# Patient Record
Sex: FEMALE | Race: OTHER | NOT HISPANIC OR LATINO
[De-identification: names, ages, dates, MRNs, and addresses within clinical notes are randomized per-mention and may not be internally consistent; named-entity substitution may affect disease eponyms.]

---

## 2017-03-10 ENCOUNTER — APPOINTMENT (OUTPATIENT)
Dept: UROLOGY | Facility: CLINIC | Age: 62
End: 2017-03-10

## 2017-03-10 DIAGNOSIS — N13.30 UNSPECIFIED HYDRONEPHROSIS: ICD-10-CM

## 2017-03-13 LAB
APPEARANCE: CLEAR
BACTERIA UR CULT: ABNORMAL
BACTERIA: ABNORMAL
BILIRUBIN URINE: NEGATIVE
BLOOD URINE: ABNORMAL
COLOR: YELLOW
CORE LAB FLUID CYTOLOGY: NORMAL
GLUCOSE QUALITATIVE U: NORMAL MG/DL
HYALINE CASTS: 0 /LPF
KETONES URINE: NEGATIVE
LEUKOCYTE ESTERASE URINE: ABNORMAL
MICROSCOPIC-UA: NORMAL
NITRITE URINE: POSITIVE
PH URINE: 6
PROTEIN URINE: NEGATIVE MG/DL
RED BLOOD CELLS URINE: 2 /HPF
SPECIFIC GRAVITY URINE: 1.02
SQUAMOUS EPITHELIAL CELLS: 4 /HPF
UROBILINOGEN URINE: NORMAL MG/DL
WHITE BLOOD CELLS URINE: 36 /HPF

## 2017-03-23 ENCOUNTER — FORM ENCOUNTER (OUTPATIENT)
Age: 62
End: 2017-03-23

## 2017-03-24 ENCOUNTER — APPOINTMENT (OUTPATIENT)
Dept: UROLOGY | Facility: CLINIC | Age: 62
End: 2017-03-24

## 2017-03-24 ENCOUNTER — OUTPATIENT (OUTPATIENT)
Dept: OUTPATIENT SERVICES | Facility: HOSPITAL | Age: 62
LOS: 1 days | End: 2017-03-24
Payer: COMMERCIAL

## 2017-03-24 ENCOUNTER — APPOINTMENT (OUTPATIENT)
Dept: CT IMAGING | Facility: IMAGING CENTER | Age: 62
End: 2017-03-24

## 2017-03-24 VITALS — SYSTOLIC BLOOD PRESSURE: 117 MMHG | DIASTOLIC BLOOD PRESSURE: 75 MMHG | RESPIRATION RATE: 16 BRPM | HEART RATE: 56 BPM

## 2017-03-24 DIAGNOSIS — R31.0 GROSS HEMATURIA: ICD-10-CM

## 2017-03-24 DIAGNOSIS — R35.0 FREQUENCY OF MICTURITION: ICD-10-CM

## 2017-03-24 PROCEDURE — 52000 CYSTOURETHROSCOPY: CPT

## 2017-03-24 PROCEDURE — 74178 CT ABD&PLV WO CNTR FLWD CNTR: CPT

## 2017-03-24 PROCEDURE — 82565 ASSAY OF CREATININE: CPT

## 2017-03-27 LAB
APPEARANCE: CLEAR
BACTERIA UR CULT: NORMAL
BACTERIA: NEGATIVE
BILIRUBIN URINE: NEGATIVE
BLOOD URINE: ABNORMAL
COLOR: YELLOW
GLUCOSE QUALITATIVE U: NORMAL MG/DL
HYALINE CASTS: 0 /LPF
KETONES URINE: NEGATIVE
LEUKOCYTE ESTERASE URINE: ABNORMAL
MICROSCOPIC-UA: NORMAL
NITRITE URINE: NEGATIVE
PH URINE: 6
PROTEIN URINE: NEGATIVE MG/DL
RED BLOOD CELLS URINE: 2 /HPF
SPECIFIC GRAVITY URINE: 1.04
SQUAMOUS EPITHELIAL CELLS: 4 /HPF
UROBILINOGEN URINE: NORMAL MG/DL
WHITE BLOOD CELLS URINE: 4 /HPF

## 2017-03-28 ENCOUNTER — MESSAGE (OUTPATIENT)
Age: 62
End: 2017-03-28

## 2017-03-28 PROBLEM — R31.0 GROSS HEMATURIA: Status: ACTIVE | Noted: 2017-03-10

## 2017-03-31 DIAGNOSIS — R31.0 GROSS HEMATURIA: ICD-10-CM

## 2018-11-20 ENCOUNTER — OUTPATIENT (OUTPATIENT)
Dept: OUTPATIENT SERVICES | Facility: HOSPITAL | Age: 63
LOS: 1 days | Discharge: HOME | End: 2018-11-20
Payer: COMMERCIAL

## 2018-11-20 DIAGNOSIS — Z01.818 ENCOUNTER FOR OTHER PREPROCEDURAL EXAMINATION: ICD-10-CM

## 2018-11-20 PROCEDURE — 71046 X-RAY EXAM CHEST 2 VIEWS: CPT | Mod: 26

## 2018-12-10 ENCOUNTER — APPOINTMENT (OUTPATIENT)
Dept: CV DIAGNOSTICS | Facility: HOSPITAL | Age: 63
End: 2018-12-10

## 2018-12-11 ENCOUNTER — APPOINTMENT (OUTPATIENT)
Dept: CV DIAGNOSTICS | Facility: HOSPITAL | Age: 63
End: 2018-12-11

## 2018-12-11 ENCOUNTER — OUTPATIENT (OUTPATIENT)
Dept: OUTPATIENT SERVICES | Facility: HOSPITAL | Age: 63
LOS: 1 days | End: 2018-12-11
Payer: COMMERCIAL

## 2018-12-11 DIAGNOSIS — I25.10 ATHEROSCLEROTIC HEART DISEASE OF NATIVE CORONARY ARTERY WITHOUT ANGINA PECTORIS: ICD-10-CM

## 2018-12-11 PROCEDURE — 93017 CV STRESS TEST TRACING ONLY: CPT

## 2018-12-11 PROCEDURE — A9500: CPT

## 2018-12-11 PROCEDURE — 93018 CV STRESS TEST I&R ONLY: CPT

## 2018-12-11 PROCEDURE — 78452 HT MUSCLE IMAGE SPECT MULT: CPT | Mod: 26

## 2018-12-11 PROCEDURE — 93016 CV STRESS TEST SUPVJ ONLY: CPT

## 2018-12-11 PROCEDURE — 78452 HT MUSCLE IMAGE SPECT MULT: CPT

## 2020-06-29 ENCOUNTER — APPOINTMENT (OUTPATIENT)
Dept: UROLOGY | Facility: CLINIC | Age: 65
End: 2020-06-29
Payer: COMMERCIAL

## 2020-06-29 VITALS
SYSTOLIC BLOOD PRESSURE: 115 MMHG | DIASTOLIC BLOOD PRESSURE: 73 MMHG | HEIGHT: 62 IN | WEIGHT: 135 LBS | TEMPERATURE: 98 F | BODY MASS INDEX: 24.84 KG/M2 | HEART RATE: 76 BPM | RESPIRATION RATE: 16 BRPM

## 2020-06-29 VITALS — TEMPERATURE: 98.2 F

## 2020-06-29 DIAGNOSIS — R31.29 OTHER MICROSCOPIC HEMATURIA: ICD-10-CM

## 2020-06-29 PROCEDURE — 99203 OFFICE O/P NEW LOW 30 MIN: CPT

## 2020-06-29 RX ORDER — SULFAMETHOXAZOLE AND TRIMETHOPRIM 800; 160 MG/1; MG/1
800-160 TABLET ORAL
Qty: 14 | Refills: 0 | Status: DISCONTINUED | COMMUNITY
Start: 2017-03-13 | End: 2020-06-29

## 2020-06-29 RX ORDER — ANASTROZOLE TABLETS 1 MG/1
TABLET ORAL
Refills: 0 | Status: ACTIVE | COMMUNITY

## 2020-06-29 RX ORDER — ATORVASTATIN CALCIUM 80 MG/1
TABLET, FILM COATED ORAL
Refills: 0 | Status: ACTIVE | COMMUNITY

## 2020-06-29 RX ORDER — METFORMIN HYDROCHLORIDE 625 MG/1
TABLET ORAL
Refills: 0 | Status: ACTIVE | COMMUNITY

## 2020-06-29 NOTE — HISTORY OF PRESENT ILLNESS
[FreeTextEntry1] : 66yo female with cc of microscopic hematuria. Pt was seen in 2013 for microscopic blood and then in 2017 for gross blood. No tobacco hx,  smoked 2ppd though for 30+ years (outside the house). No exposures. No prior hx of stones. Brother with hx of stones (and stone surgery). She had CT that showed L side stone and was scheduled for ESWL that was cancelled twice and came here for a second opinion. US showed mild R hydro, no stones. On the left, there is a 4+mm lower pole stone and no hydro. She underwent renal scan that showed no obstruction. in 2017 she had repeat. CT showed stone on LLP. She was noted incidentally to have thickened endometrium. She had negative eval and is seeing them again soon. \par \par Since last visit, had L TKR and discovered R breast ca s/p mastectomy. No chemo or RT. \par \par No gross blood. SHe was recently seen by onc and had labs that showed microscopic blood. At baseline, has some frequency but is not bothered by this. She is trying to drink water better. No urinary incontinence.

## 2020-06-29 NOTE — REVIEW OF SYSTEMS
[Told you have blood in urine on a urine test] : told blood was present in a urine test [History of kidney stones] : history of kidney stones [Joint Pain] : joint pain [Limb Weakness] : limb weakness [Negative] : Heme/Lymph

## 2020-06-29 NOTE — ASSESSMENT
[FreeTextEntry1] : Microscopic hemturia with negative eval in 2017. Known stone as likely source. Will plan on renal US for eval. \par --Renal US. will call pt with results

## 2020-07-15 ENCOUNTER — APPOINTMENT (OUTPATIENT)
Dept: ULTRASOUND IMAGING | Facility: IMAGING CENTER | Age: 65
End: 2020-07-15
Payer: COMMERCIAL

## 2020-07-15 ENCOUNTER — OUTPATIENT (OUTPATIENT)
Dept: OUTPATIENT SERVICES | Facility: HOSPITAL | Age: 65
LOS: 1 days | End: 2020-07-15
Payer: COMMERCIAL

## 2020-07-15 DIAGNOSIS — N20.0 CALCULUS OF KIDNEY: ICD-10-CM

## 2020-07-15 DIAGNOSIS — R31.29 OTHER MICROSCOPIC HEMATURIA: ICD-10-CM

## 2020-07-15 PROCEDURE — 76770 US EXAM ABDO BACK WALL COMP: CPT | Mod: 26

## 2020-07-15 PROCEDURE — 76770 US EXAM ABDO BACK WALL COMP: CPT

## 2021-01-15 ENCOUNTER — APPOINTMENT (OUTPATIENT)
Dept: UROLOGY | Facility: CLINIC | Age: 66
End: 2021-01-15

## 2023-06-23 ENCOUNTER — APPOINTMENT (OUTPATIENT)
Dept: UROLOGY | Facility: CLINIC | Age: 68
End: 2023-06-23
Payer: MEDICARE

## 2023-06-23 VITALS
SYSTOLIC BLOOD PRESSURE: 116 MMHG | DIASTOLIC BLOOD PRESSURE: 73 MMHG | HEIGHT: 62 IN | BODY MASS INDEX: 24.84 KG/M2 | OXYGEN SATURATION: 99 % | HEART RATE: 70 BPM | WEIGHT: 135 LBS

## 2023-06-23 DIAGNOSIS — N20.0 CALCULUS OF KIDNEY: ICD-10-CM

## 2023-06-23 PROCEDURE — 99213 OFFICE O/P EST LOW 20 MIN: CPT

## 2023-06-23 RX ORDER — LOSARTAN POTASSIUM 100 MG/1
TABLET, FILM COATED ORAL
Refills: 0 | Status: ACTIVE | COMMUNITY

## 2023-06-23 RX ORDER — LISINOPRIL 30 MG/1
TABLET ORAL
Refills: 0 | Status: DISCONTINUED | COMMUNITY
End: 2023-06-23

## 2023-06-23 NOTE — ASSESSMENT
[FreeTextEntry1] : Known hx of stones. Has had R flank pain. Last imaging was 6/2020 and no stones on R. Is planning on having CT next week in order from GI. \par --CT next week. Asked pt to have results sent to me and obtain disc to review as needed.

## 2023-06-23 NOTE — PHYSICAL EXAM
[General Appearance - Well Developed] : well developed [General Appearance - In No Acute Distress] : no acute distress [General Appearance - Well Nourished] : well nourished [Abdomen Soft] : soft [Abdomen Tenderness] : non-tender [Costovertebral Angle Tenderness] : no ~M costovertebral angle tenderness [FreeTextEntry1] : no scars [Skin Color & Pigmentation] : normal skin color and pigmentation [Edema] : no peripheral edema [Exaggerated Use Of Accessory Muscles For Inspiration] : no accessory muscle use [Oriented To Time, Place, And Person] : oriented to person, place, and time [Normal Station and Gait] : the gait and station were normal for the patient's age [No Focal Deficits] : no focal deficits

## 2023-06-23 NOTE — HISTORY OF PRESENT ILLNESS
[FreeTextEntry1] : 67yo female with cc of flank pain and kidney stones. Pt was seen in 2013 for microscopic blood and then in 2017 for gross blood. No tobacco hx,  smoked 2ppd though for 30+ years (outside the house). No exposures. No prior hx of stones. Brother with hx of stones (and stone surgery). She had CT that showed L side stone and was scheduled for ESWL that was cancelled twice and came here for a second opinion back in 2013. US showed mild R hydro, no stones. On the left, there is a 4+mm lower pole stone and no hydro. She underwent renal scan that showed no obstruction. in 2017 she had repeat. CT showed stone on LLP. She was noted incidentally to have thickened endometrium. Last seen by me in 2020. Had US then that showed LLP 4mm stone with no R stones and no hydro. \par \par Comes in today for visit. She has noted intermittent R flank pain. This is dull. No radiation. No nausea associated. SHe has tried to increase her fluid intake. At baseline, drinks about 1.5L of water. Most recent Cr 0.6. 6/2023. A1c 6.2. No recent imaging.

## 2023-07-11 ENCOUNTER — NON-APPOINTMENT (OUTPATIENT)
Age: 68
End: 2023-07-11